# Patient Record
Sex: FEMALE
[De-identification: names, ages, dates, MRNs, and addresses within clinical notes are randomized per-mention and may not be internally consistent; named-entity substitution may affect disease eponyms.]

---

## 2023-04-07 ENCOUNTER — APPOINTMENT (OUTPATIENT)
Dept: ORTHOPEDIC SURGERY | Facility: CLINIC | Age: 28
End: 2023-04-07
Payer: OTHER GOVERNMENT

## 2023-04-07 VITALS — BODY MASS INDEX: 32.2 KG/M2 | WEIGHT: 230 LBS | HEIGHT: 71 IN

## 2023-04-07 DIAGNOSIS — S83.002A UNSPECIFIED SUBLUXATION OF LEFT PATELLA, INITIAL ENCOUNTER: ICD-10-CM

## 2023-04-07 PROBLEM — Z00.00 ENCOUNTER FOR PREVENTIVE HEALTH EXAMINATION: Status: ACTIVE | Noted: 2023-04-07

## 2023-04-07 PROCEDURE — 99203 OFFICE O/P NEW LOW 30 MIN: CPT

## 2023-04-07 PROCEDURE — 73562 X-RAY EXAM OF KNEE 3: CPT | Mod: LT

## 2023-04-07 NOTE — DISCUSSION/SUMMARY
[de-identified] : Treatment plan as discussed:\par \par   The patient had a subluxation of her patella of the left knee that which may have caused collateral damage to the ligaments in the area upon subluxation.\par Script for MRI   Of the left knee ordered to further evaluate for internal derangement. Encouraged patient to call the office following the MRI to discuss results.\par \par I recommended anti-inflammatory medication. Patient agrees to taking Advil/Ibuprofen OTC as needed for pain. Benefits discussed. Confirmed no contraindication to NSAIDs.\par \par I recommended patient rest, ice, compress, and elevate the left knee regularly. Encouraged activity modification as tolerable. Encouraged gentle range of motion to avoid stiffness. No gym /sports at least until further evaluation.\par \par Script for physical therapy provided for improved ROM and strengthening of surrounding musculature. Benefits discussed.\par At home AAOS knee exercises provided to patient for strengthening of surrounding musculature and improved ROM. \par \par All questions and concerns addressed to patient's satisfaction. Patient expresses full understanding of treatment plan.\par Patient will follow up in 4-6 weeks with [facility] for further evaluation treatment.\par The patient was seen under supervision of Dr. Dong.\par \par Patient was seen under  supervision of Dr. Dong.\par

## 2023-04-07 NOTE — IMAGING
[de-identified] :   Physical examination of the left knee:  Mild swelling appreciated throughout.  No ecchymosis or erythema appreciated.  Skin is intact.  Patient is mildly tender to palpation along the medial joint line radiating to the posterior aspect of left knee.  No tenderness of the patella, patella tendon, or quad tendon.  The tense at the medial joint line.  Stable to varus and valgus stress.  Positive Suzanne's.  Negative Lachman's.  calf is soft and nontender.  Patient able to ambulate and bear weight over experiences pain when doing so.  Range of motion upon flexion limited secondary to pain and swelling.  Patient can fully extend the knee.  Patient can straight leg raise of the table.

## 2023-04-07 NOTE — HISTORY OF PRESENT ILLNESS
[de-identified] :  patient is a 27-year-old female here for evaluation of left knee pain.  Patient reports about 2 weeks ago she was playing basketball when she felt as if her patella subluxed and popped back into place.  She has been experiencing intermittent episodes of sharp pain to the lateral aspect of her left knee that which is ranging towards the posterior aspect of her left knee upon certain movements such as flexing and going up and down stairs.  She also reports some mild popping/ clicking upon ambulation.  She has been able to bear weight and ambulate however experiences mild pain when doing so

## 2023-04-07 NOTE — DATA REVIEWED
[FreeTextEntry1] :   X-rays left knee taken yesterday: No acute fractures, subluxations, dislocations.

## 2023-05-16 ENCOUNTER — APPOINTMENT (OUTPATIENT)
Dept: ORTHOPEDIC SURGERY | Facility: CLINIC | Age: 28
End: 2023-05-16